# Patient Record
Sex: MALE | Employment: STUDENT | URBAN - NONMETROPOLITAN AREA
[De-identification: names, ages, dates, MRNs, and addresses within clinical notes are randomized per-mention and may not be internally consistent; named-entity substitution may affect disease eponyms.]

---
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
## 2025-06-07 ENCOUNTER — OFFICE VISIT (OUTPATIENT)
Age: 20
End: 2025-06-07

## 2025-06-07 VITALS
RESPIRATION RATE: 18 BRPM | BODY MASS INDEX: 30.11 KG/M2 | TEMPERATURE: 97.9 F | WEIGHT: 198 LBS | HEART RATE: 81 BPM | SYSTOLIC BLOOD PRESSURE: 122 MMHG | OXYGEN SATURATION: 98 % | DIASTOLIC BLOOD PRESSURE: 70 MMHG

## 2025-06-07 DIAGNOSIS — R11.2 NAUSEA AND VOMITING, UNSPECIFIED VOMITING TYPE: ICD-10-CM

## 2025-06-07 DIAGNOSIS — A08.4 VIRAL GASTROENTERITIS: Primary | ICD-10-CM

## 2025-06-07 RX ORDER — ONDANSETRON 4 MG/1
4 TABLET, ORALLY DISINTEGRATING ORAL 3 TIMES DAILY PRN
Qty: 21 TABLET | Refills: 0 | Status: SHIPPED | OUTPATIENT
Start: 2025-06-07

## 2025-06-07 ASSESSMENT — ENCOUNTER SYMPTOMS
SORE THROAT: 0
ABDOMINAL DISTENTION: 0
WHEEZING: 0
SINUS PRESSURE: 0
SINUS PAIN: 0
COLOR CHANGE: 0
NAUSEA: 1
COUGH: 0
DIARRHEA: 1
VOMITING: 1
EYE PAIN: 0
RHINORRHEA: 0
EYE ITCHING: 0
SHORTNESS OF BREATH: 0
EYE DISCHARGE: 0
CONSTIPATION: 0
TROUBLE SWALLOWING: 0
ABDOMINAL PAIN: 0
APNEA: 0
CHEST TIGHTNESS: 0

## 2025-06-07 NOTE — PATIENT INSTRUCTIONS
Viral Gastroenteritis    - Zofran as needed for nausea.  - Maintain adequate fluid intake.  - Avoid any NSAID due to GI side effects.  - BRAT diet, advance as tolerated.  - Return to the clinic or follow up with PCP if symptoms worsen or fail to improve.

## 2025-06-07 NOTE — PROGRESS NOTES
[facility], [facility] ([place])  [facility]  [address]  [facility] [address]  Dept: [phone]  Dept Fax: [phone]    José Miguel Live is a 19 y.o. male who presents today for his medical conditions/complaints as noted below.  José Miguel Live is c/o of Nausea (N/v SINCE YESTERDAY )        HPI:     José Miguel Live presents with complaints of nausea, vomiting, and diarrhea. Patient states his symptoms started yesterday. He has taken tylenol and nausea medication, but hasn't gotten any relief. He denies any other symptoms, including sore throat, fever or chills. No known sick contacts. He does not appear to be in distress. He is stable at this time.     Denies any recent antibiotic or steroid administration.      Past Medical History:   Diagnosis Date    PVNS (pigmented villonodular synovitis)      Past Surgical History:   Procedure Laterality Date    LEG SURGERY Left     PVNS x2    TONSILLECTOMY         Family History   Problem Relation Age of Onset    No Known Problems Mother     Diabetes Father        Social History     Tobacco Use    Smoking status: Never    Smokeless tobacco: Never   Substance Use Topics    Alcohol use: Never      Current Outpatient Medications   Medication Sig Dispense Refill    ondansetron (ZOFRAN-ODT) 4 MG disintegrating tablet Take 1 tablet by mouth 3 times daily as needed for Nausea or Vomiting 21 tablet 0     No current facility-administered medications for this visit.     No Known Allergies    Health Maintenance   Topic Date Due    Depression Screen  Never done    HPV vaccine (1 - Male 3-dose series) Never done    HIV screen  Never done    Meningococcal B vaccine (1 of 2 - Standard) Never done    Hepatitis C screen  Never done    COVID-19 Vaccine (3 - 2024-25 season) 09/01/2024    Flu vaccine (Season Ended) 08/01/2025    DTaP/Tdap/Td vaccine (7 - Td or Tdap) 08/03/2027    Hepatitis A vaccine  Completed    Hepatitis B vaccine  Completed    Hib vaccine